# Patient Record
Sex: FEMALE | Race: WHITE | NOT HISPANIC OR LATINO | Employment: STUDENT | URBAN - METROPOLITAN AREA
[De-identification: names, ages, dates, MRNs, and addresses within clinical notes are randomized per-mention and may not be internally consistent; named-entity substitution may affect disease eponyms.]

---

## 2017-10-09 ENCOUNTER — CLINICAL SUPPORT (OUTPATIENT)
Dept: INTERNAL MEDICINE | Facility: CLINIC | Age: 22
End: 2017-10-09
Payer: COMMERCIAL

## 2017-10-09 ENCOUNTER — OFFICE VISIT (OUTPATIENT)
Dept: PULMONOLOGY | Facility: CLINIC | Age: 22
End: 2017-10-09
Payer: COMMERCIAL

## 2017-10-09 ENCOUNTER — OFFICE VISIT (OUTPATIENT)
Dept: OBSTETRICS AND GYNECOLOGY | Facility: CLINIC | Age: 22
End: 2017-10-09
Payer: COMMERCIAL

## 2017-10-09 VITALS
DIASTOLIC BLOOD PRESSURE: 64 MMHG | HEIGHT: 64 IN | WEIGHT: 110 LBS | HEART RATE: 56 BPM | BODY MASS INDEX: 18.78 KG/M2 | SYSTOLIC BLOOD PRESSURE: 101 MMHG

## 2017-10-09 VITALS
WEIGHT: 115.19 LBS | BODY MASS INDEX: 19.67 KG/M2 | DIASTOLIC BLOOD PRESSURE: 60 MMHG | SYSTOLIC BLOOD PRESSURE: 98 MMHG | HEIGHT: 64 IN

## 2017-10-09 DIAGNOSIS — Z00.00 ANNUAL PHYSICAL EXAM: Primary | ICD-10-CM

## 2017-10-09 DIAGNOSIS — Z00.00 ROUTINE GENERAL MEDICAL EXAMINATION AT A HEALTH CARE FACILITY: Primary | ICD-10-CM

## 2017-10-09 DIAGNOSIS — Z30.46 NEXPLANON REMOVAL: Primary | ICD-10-CM

## 2017-10-09 LAB
ALBUMIN SERPL BCP-MCNC: 3.7 G/DL
ALP SERPL-CCNC: 52 U/L
ALT SERPL W/O P-5'-P-CCNC: 33 U/L
ANION GAP SERPL CALC-SCNC: 5 MMOL/L
AST SERPL-CCNC: 23 U/L
BILIRUB SERPL-MCNC: 1.4 MG/DL
BUN SERPL-MCNC: 8 MG/DL
CALCIUM SERPL-MCNC: 9 MG/DL
CHLORIDE SERPL-SCNC: 107 MMOL/L
CHOLEST SERPL-MCNC: 109 MG/DL
CHOLEST/HDLC SERPL: 2.6 {RATIO}
CO2 SERPL-SCNC: 27 MMOL/L
CREAT SERPL-MCNC: 0.8 MG/DL
ERYTHROCYTE [DISTWIDTH] IN BLOOD BY AUTOMATED COUNT: 13.1 %
EST. GFR  (AFRICAN AMERICAN): >60 ML/MIN/1.73 M^2
EST. GFR  (NON AFRICAN AMERICAN): >60 ML/MIN/1.73 M^2
ESTIMATED AVG GLUCOSE: 85 MG/DL
GLUCOSE SERPL-MCNC: 82 MG/DL
HBA1C MFR BLD HPLC: 4.6 %
HCT VFR BLD AUTO: 41.3 %
HDLC SERPL-MCNC: 42 MG/DL
HDLC SERPL: 38.5 %
HGB BLD-MCNC: 14.7 G/DL
LDLC SERPL CALC-MCNC: 56.8 MG/DL
MCH RBC QN AUTO: 30.5 PG
MCHC RBC AUTO-ENTMCNC: 35.6 G/DL
MCV RBC AUTO: 86 FL
NONHDLC SERPL-MCNC: 67 MG/DL
PLATELET # BLD AUTO: 164 K/UL
PMV BLD AUTO: 12.1 FL
POTASSIUM SERPL-SCNC: 3.9 MMOL/L
PROT SERPL-MCNC: 6.8 G/DL
RBC # BLD AUTO: 4.82 M/UL
SODIUM SERPL-SCNC: 139 MMOL/L
TRIGL SERPL-MCNC: 51 MG/DL
WBC # BLD AUTO: 5.82 K/UL

## 2017-10-09 PROCEDURE — 99395 PREV VISIT EST AGE 18-39: CPT | Mod: S$GLB,,, | Performed by: INTERNAL MEDICINE

## 2017-10-09 PROCEDURE — 36415 COLL VENOUS BLD VENIPUNCTURE: CPT

## 2017-10-09 PROCEDURE — 99999 PR PBB SHADOW E&M-EST. PATIENT-LVL III: CPT | Mod: PBBFAC,,, | Performed by: INTERNAL MEDICINE

## 2017-10-09 PROCEDURE — 85027 COMPLETE CBC AUTOMATED: CPT

## 2017-10-09 PROCEDURE — 80061 LIPID PANEL: CPT

## 2017-10-09 PROCEDURE — 99499 UNLISTED E&M SERVICE: CPT | Mod: S$GLB,,, | Performed by: OBSTETRICS & GYNECOLOGY

## 2017-10-09 PROCEDURE — 11982 REMOVE DRUG IMPLANT DEVICE: CPT | Mod: S$GLB,,, | Performed by: OBSTETRICS & GYNECOLOGY

## 2017-10-09 PROCEDURE — 90688 IIV4 VACCINE SPLT 0.5 ML IM: CPT | Mod: S$GLB,,, | Performed by: INTERNAL MEDICINE

## 2017-10-09 PROCEDURE — 99999 PR PBB SHADOW E&M-EST. PATIENT-LVL III: CPT | Mod: PBBFAC,,, | Performed by: OBSTETRICS & GYNECOLOGY

## 2017-10-09 PROCEDURE — 83036 HEMOGLOBIN GLYCOSYLATED A1C: CPT

## 2017-10-09 PROCEDURE — 80053 COMPREHEN METABOLIC PANEL: CPT

## 2017-10-09 PROCEDURE — 90471 IMMUNIZATION ADMIN: CPT | Mod: S$GLB,,, | Performed by: INTERNAL MEDICINE

## 2017-10-09 RX ORDER — NORGESTIMATE AND ETHINYL ESTRADIOL 7DAYSX3 LO
1 KIT ORAL DAILY
Qty: 84 TABLET | Refills: 3 | Status: SHIPPED | OUTPATIENT
Start: 2017-10-09 | End: 2018-10-09

## 2017-10-09 NOTE — PROGRESS NOTES
Subjective:       Patient ID: Desi Hinton is a 22 y.o. female.    Chief Complaint: Annual Exam    HPI 21 yo female living in [place], [place] 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 She was seen recently in the local ER for pain and all studies were normal.She takes zantac and zololft and Ortho Tri Cyclen  Review of Systems   Constitutional: Negative.    HENT: Negative.    Eyes: Negative.    Respiratory: Negative.    Cardiovascular: Negative.    Gastrointestinal: Negative.    Genitourinary: Negative.         Hx of urological surgery at age 17. For UJP   Musculoskeletal: Negative.    Skin: Negative.    Neurological: Negative.    Psychiatric/Behavioral: Negative.    All other systems reviewed and are negative.      Objective:      Physical Exam   Constitutional: She is oriented to person, place, and time. She appears well-developed and well-nourished. No distress.   HENT:   Head: Normocephalic and atraumatic.   Right Ear: External ear normal.   Left Ear: External ear normal.   Nose: Nose normal.   Mouth/Throat: Oropharynx is clear and moist.   Eyes: Conjunctivae and EOM are normal. Pupils are equal, round, and reactive to light.   Neck: Normal range of motion. Neck supple. No JVD present. No thyromegaly present.   Cardiovascular: Normal rate, regular rhythm, normal heart sounds and intact distal pulses.  Exam reveals no gallop.    No murmur heard.  Pulmonary/Chest: Breath sounds normal. No stridor. No respiratory distress. She has no wheezes. She has no rales. She exhibits no tenderness.   Peak flow 400 l/min   Abdominal: Soft. Bowel sounds are normal. She exhibits no distension and no mass. There is no tenderness. There is no rebound and no guarding.   Musculoskeletal: Normal range of motion. She exhibits no edema.   Lymphadenopathy:     She has no cervical adenopathy.   Neurological:  She is alert and oriented to person, place, and time. She has normal reflexes. She displays normal reflexes. No cranial nerve deficit.   Skin: Skin is warm and dry. No rash noted.   Psychiatric: She has a normal mood and affect. Her behavior is normal. Judgment and thought content normal.   Nursing note and vitals reviewed.      Assessment:       No diagnosis found.    Plan:             Labs; All values are normal and unchanged from 2016.

## 2017-10-09 NOTE — PROGRESS NOTES
Nexplanon Removal:    Patient has Nexplanon which was placed 3 years ago. Ready to have it removed as it is reaching the end of efficacy.    Patient was counseled on risks, benefits and alternatives to Nexplanon removal and after all of her questions were answered she agreed to proceed.     Time out performed.    Procedure in detail:   Implant palpated in the medial area left upper arm cleaned with alcohol. Area injected with 1% lidocaine. Area then cleaned with betadine. Distal end of Nexplanon pushed toward the skin surface to tent up the tissue. 2 mm stab incision made and capsule grasped with hemastat. Capsule then incised. Second hemostat used to extract implant. Nexplanon was noted to be intact. Betadine cleaned and band-aid placed. Pressure dressing placed and postop care discussed.     Patient tolerated the procedure well.     Plan for Contraception: oral contraceptives (estrogen/progesterone)

## 2017-10-09 NOTE — LETTER
October 9, 2017    Desi Hinton  [address]  [place] [address]             [facility]  [address]  [place] [address]  Phone: [phone] Dear Ms. Hinton:    Thank you for allowing me to serve you and perform your Executive Health exam on 10/9/2017. This letter will serve as a brief summary of the physical findings and laboratory/studies performed and recommendations at this time. Today's exam is normal in all respects. Good luck with your new business.         If you have any questions or concerns, please don't hesitate to call.    Sincerely,        Malik Lang MD

## 2018-05-21 ENCOUNTER — OFFICE VISIT (OUTPATIENT)
Dept: FAMILY MEDICINE CLINIC | Age: 23
End: 2018-05-21

## 2018-05-21 VITALS
BODY MASS INDEX: 19.83 KG/M2 | DIASTOLIC BLOOD PRESSURE: 63 MMHG | RESPIRATION RATE: 16 BRPM | WEIGHT: 119 LBS | TEMPERATURE: 98.8 F | OXYGEN SATURATION: 98 % | HEIGHT: 65 IN | HEART RATE: 70 BPM | SYSTOLIC BLOOD PRESSURE: 98 MMHG

## 2018-05-21 DIAGNOSIS — Z76.89 ESTABLISHING CARE WITH NEW DOCTOR, ENCOUNTER FOR: ICD-10-CM

## 2018-05-21 DIAGNOSIS — F41.9 ANXIETY AND DEPRESSION: Primary | ICD-10-CM

## 2018-05-21 DIAGNOSIS — R53.82 CHRONIC FATIGUE: ICD-10-CM

## 2018-05-21 DIAGNOSIS — K21.9 GASTROESOPHAGEAL REFLUX DISEASE, ESOPHAGITIS PRESENCE NOT SPECIFIED: ICD-10-CM

## 2018-05-21 DIAGNOSIS — F32.A ANXIETY AND DEPRESSION: Primary | ICD-10-CM

## 2018-05-21 RX ORDER — AMOXICILLIN 875 MG/1
875 TABLET, FILM COATED ORAL 2 TIMES DAILY
COMMUNITY
End: 2018-06-11 | Stop reason: ALTCHOICE

## 2018-05-21 RX ORDER — SERTRALINE HYDROCHLORIDE 25 MG/1
25 TABLET, FILM COATED ORAL DAILY
Qty: 30 TAB | Refills: 1 | Status: SHIPPED | OUTPATIENT
Start: 2018-05-21

## 2018-05-21 NOTE — MR AVS SNAPSHOT
[address] 
[phone] Patient: Morenita Griffith 
MRN: [id number] BWK:[birth year] Visit Information Date & Time Provider Department Dept. Phone Encounter #  
 5/21/2018  8:35 AM Edyta Carrillo, [facility] [facility] [phone] [id number] Follow-up Instructions Return in about 2 weeks (around 6/4/2018) for Follow up. Upcoming Health Maintenance Date Due  
 HPV Age 9Y-34Y (1 of 1 - Female 3 Dose Series) 6/17/2006 DTaP/Tdap/Td series (1 - Tdap) 6/17/2016 PAP AKA CERVICAL CYTOLOGY 6/17/2016 Influenza Age 5 to Adult 8/1/2018 Allergies as of 5/21/2018  Review Complete On: 5/21/2018 By: Paulina Raygoza LPN Severity Noted Reaction Type Reactions Diflucan [Fluconazole]  05/21/2018    Swelling Throat Monistat 1 (Tioconazole) [Tioconazole]  05/21/2018    Other (comments) Vaginal burning Current Immunizations  Never Reviewed No immunizations on file. Not reviewed this visit You Were Diagnosed With   
  
 Codes Comments Anxiety and depression    -  Primary ICD-10-CM: F41.9, F32.9 ICD-9-CM: 300.00, 311 Gastroesophageal reflux disease, esophagitis presence not specified     ICD-10-CM: K21.9 ICD-9-CM: 530.81 Chronic fatigue     ICD-10-CM: R53.82 
ICD-9-CM: 780.79 Establishing care with new doctor, encounter for     ICD-10-CM: Z76.89 
ICD-9-CM: V65.8 Vitals BP Pulse Temp Resp Height(growth percentile) Weight(growth percentile) 98/63 70 98.8 °F (37.1 °C) (Oral) 16 5' 5\" (1.651 m) 119 lb (54 kg) SpO2 BMI Smoking Status 98% 19.8 kg/m2 Never Smoker Vitals History BMI and BSA Data Body Mass Index Body Surface Area  
 19.8 kg/m 2 1.57 m 2 Preferred Pharmacy Pharmacy Name Phone [place] [address] [address] [phone] Your Updated Medication List  
  
 This list is accurate as of 5/21/18  9:23 AM.  Always use your most recent med list.  
  
  
  
  
 amoxicillin 875 mg tablet Commonly known as:  AMOXIL Take 875 mg by mouth two (2) times a day. LILETTA IU  
by IntraUTERine route. M-VIT PO Take  by mouth. sertraline 25 mg tablet Commonly known as:  ZOLOFT Take 1 Tab by mouth daily. Prescriptions Sent to Pharmacy Refills  
 sertraline (ZOLOFT) 25 mg tablet 1 Sig: Take 1 Tab by mouth daily. Class: Normal  
 [facility] [address] [address] [facility]. Ph #: [phone] Route: Oral  
  
We Performed the Following BEHAV ASSMT W/SCORE & DOCD/STAND INSTRUMENT [id number] CPT(R)] CBC WITH AUTOMATED DIFF [09118 CPT(R)] METABOLIC PANEL, COMPREHENSIVE [83691 CPT(R)] REFERRAL TO PSYCHOLOGY [EXJ41 Custom] TSH RFX ON ABNORMAL TO FREE T4 [[id number] Custom] Follow-up Instructions Return in about 2 weeks (around 6/4/2018) for Follow up. Referral Information Referral ID Referred By Referred To  
  
 [id number] Bk DAVIDSON Not Available Visits Status Start Date End Date 1 New Request 5/21/18 5/21/19 If your referral has a status of pending review or denied, additional information will be sent to support the outcome of this decision. Patient Instructions   
-You can schedule an appointment with the [address] [place] [address] (Tuesday mornings) at the . 
 
-Please follow up in 2-4 weeks after starting zoloft 25 mg once daily. Recovering From Depression: Care Instructions Your Care Instructions Taking good care of yourself is important as you recover from depression. In time, your symptoms will fade as your treatment takes hold. Do not give up. Instead, focus your energy on getting better. Your mood will improve. It just takes some time.  Focus on things that can help you feel better, such as being with friends and family, eating well, and getting enough rest. But take things slowly. Do not do too much too soon. You will begin to feel better gradually. Follow-up care is a key part of your treatment and safety. Be sure to make and go to all appointments, and call your doctor if you are having problems. It's also a good idea to know your test results and keep a list of the medicines you take. How can you care for yourself at home? Be realistic · If you have a large task to do, break it up into smaller steps you can handle, and just do what you can. · You may want to put off important decisions until your depression has lifted. If you have plans that will have a major impact on your life, such as marriage, divorce, or a job change, try to wait a bit. Talk it over with friends and loved ones who can help you look at the overall picture first. 
· Reaching out to people for help is important. Do not isolate yourself. Let your family and friends help you. Find someone you can trust and confide in, and talk to that person. · Be patient, and be kind to yourself. Remember that depression is not your fault and is not something you can overcome with willpower alone. Treatment is necessary for depression, just like for any other illness. Feeling better takes time, and your mood will improve little by little. Stay active · Stay busy and get outside. Take a walk, or try some other light exercise. · Talk with your doctor about an exercise program. Exercise can help with mild depression. · Go to a movie or concert. Take part in a Taoist activity or other social gathering. Go to a ball game. · Ask a friend to have dinner with you. Take care of yourself · Eat a balanced diet with plenty of fresh fruits and vegetables, whole grains, and lean protein. If you have lost your appetite, eat small snacks rather than large meals. · Avoid drinking alcohol or using illegal drugs. Do not take medicines that have not been prescribed for you. They may interfere with medicines you may be taking for depression, or they may make your depression worse. · Take your medicines exactly as they are prescribed. You may start to feel better within 1 to 3 weeks of taking antidepressant medicine. But it can take as many as 6 to 8 weeks to see more improvement. If you have questions or concerns about your medicines, or if you do not notice any improvement by 3 weeks, talk to your doctor. · If you have any side effects from your medicine, tell your doctor. Antidepressants can make you feel tired, dizzy, or nervous. Some people have dry mouth, constipation, headaches, sexual problems, or diarrhea. Many of these side effects are mild and will go away on their own after you have been taking the medicine for a few weeks. Some may last longer. Talk to your doctor if side effects are bothering you too much. You might be able to try a different medicine. · Get enough sleep. If you have problems sleeping: ¨ Go to bed at the same time every night, and get up at the same time every morning. ¨ Keep your bedroom dark and quiet. ¨ Do not exercise after 5:00 p.m. ¨ Avoid drinks with caffeine after 5:00 p.m. · Avoid sleeping pills unless they are prescribed by the doctor treating your depression. Sleeping pills may make you groggy during the day, and they may interact with other medicine you are taking. · If you have any other illnesses, such as diabetes, heart disease, or high blood pressure, make sure to continue with your treatment. Tell your doctor about all of the medicines you take, including those with or without a prescription. · Keep the numbers for these national suicide hotlines: 0-604-781-TALK (2-946.220.7326) and 3-737-OUHKYXU (1-812.966.4262). If you or someone you know talks about suicide or feeling hopeless, get help right away. When should you call for help? Call 911 anytime you think you may need emergency care. For example, call if: 
? · You feel like hurting yourself or someone else. ? · Someone you know has depression and is about to attempt or is attempting suicide. ?Call your doctor now or seek immediate medical care if: 
? · You hear voices. ? · Someone you know has depression and: 
¨ Starts to give away his or her possessions. ¨ Uses illegal drugs or drinks alcohol heavily. ¨ Talks or writes about death, including writing suicide notes or talking about guns, knives, or pills. ¨ Starts to spend a lot of time alone. ¨ Acts very aggressively or suddenly appears calm. ? Watch closely for changes in your health, and be sure to contact your doctor if: 
? · You do not get better as expected. Where can you learn more? Go to http://lashonEdgewater Networksdesirae.info/. Enter Q114 in the search box to learn more about \"Recovering From Depression: Care Instructions. \" Current as of: May 12, 2017 Content Version: 11.4 © 3513-1358 [employer]. Care instructions adapted under license by [employer] (which disclaims liability or warranty for this information). If you have questions about a medical condition or this instruction, always ask your healthcare professional. [address] any warranty or liability for your use of this information. Sertraline (By mouth) Sertraline (SER-tra-xavi) Treats depression, obsessive-compulsive disorder (OCD), posttraumatic stress disorder (PTSD), premenstrual dysphoric disorder (PMDD), social anxiety disorder, and panic disorder. This medicine is an SSRI. Brand Name(s): Zoloft There may be other brand names for this medicine. When This Medicine Should Not Be Used: This medicine is not right for everyone. Do not use it if you had an allergic reaction to sertraline. How to Use This Medicine:  
Liquid, Tablet · Take your medicine as directed. Your dose may need to be changed several times to find what works best for you. You may need to take it for a few weeks or months before you feel better. · Oral liquid: Use the dropper provided to remove the medicine and mix it with 1/2 cup (4 ounces) of water, ginger ale, lemon-lime soda, lemonade, or orange juice. Drink the mixture right away. It is normal for it to look a bit hazy. · This medicine should come with a Medication Guide. Ask your pharmacist for a copy if you do not have one. · Missed dose: Take a dose as soon as you remember. If it is almost time for your next dose, wait until then and take a regular dose. Do not take extra medicine to make up for a missed dose. · Store the medicine in a closed container at room temperature, away from heat, moisture, and direct light. Drugs and Foods to Avoid: Ask your doctor or pharmacist before using any other medicine, including over-the-counter medicines, vitamins, and herbal products. · Do not use this medicine together with pimozide. Do not use this medicine and an MAO inhibitor (MAOI) within 14 days of each other. Do not use the oral liquid form of sertraline if you are also using disulfiram. 
· Some medicines can affect how sertraline works. Tell your doctor if you are using the following:  
¨ Buspirone, cimetidine, cisapride, diazepam, digitoxin, fentanyl, flecainide, lithium, phenytoin, propafenone, Gigi's wort, tramadol, tryptophan supplements, or valproate ¨ A blood thinner (such as warfarin), a diuretic (water pill), an NSAID pain or arthritis medicine (such as aspirin, diclofenac, ibuprofen), a tricyclic antidepressant, a triptan medicine for migraine headaches · Do not drink alcohol while you are using this medicine. Warnings While Using This Medicine: · Tell your doctor if you are pregnant or breastfeeding, or if you have liver disease, bleeding problems, glaucoma, heart disease, or a seizure disorder. · For some children, teenagers, and young adults, this medicine may increase mental or emotional problems. This may lead to thoughts of suicide and violence. Talk with your doctor right away if you have any thoughts or behavior changes that concern you. Tell your doctor if you or anyone in your family has a history of bipolar disorder or suicide attempts. · This medicine may cause the following problems:  
¨ Serotonin syndrome (when taken with certain medicines) ¨ Low sodium levels (more common in elderly patients and those who take diuretics or become dehydrated) · Tell your doctor if you are sensitive to latex, because the oral liquid comes with a latex rubber dropper. · This medicine may make you dizzy or drowsy. Do not drive or do anything that could be dangerous until you know how this medicine affects you. · Do not stop using this medicine suddenly. Your doctor will need to slowly decrease your dose before you stop it completely. · Your doctor will check your progress and the effects of this medicine at regular visits. Keep all appointments. · Keep all medicine out of the reach of children. Never share your medicine with anyone. Possible Side Effects While Using This Medicine:  
Call your doctor right away if you notice any of these side effects: · Allergic reaction: Itching or hives, swelling in your face or hands, swelling or tingling in your mouth or throat, chest tightness, trouble breathing · Anxiety, restlessness, fast heartbeat, fever, sweating, muscle spasms, twitching, nausea, vomiting, diarrhea, seeing or hearing things that are not there · Blistering, peeling, or red skin rash · Confusion, weakness, and muscle twitching · Eye pain, vision changes, seeing halos around lights · Feeling more excited or energetic than usual 
· Thoughts of hurting yourself or others, unusual behavior · Unusual bleeding or bruising If you notice these less serious side effects, talk with your doctor: · Dry mouth · Loss of appetite, weight loss · Mild diarrhea, constipation, nausea, vomiting · Sexual problems · Sleepiness, or trouble sleeping If you notice other side effects that you think are caused by this medicine, tell your doctor. Call your doctor for medical advice about side effects. You may report side effects to FDA at 7-165-JKL-7304 © 2017 [address]  Information is for End User's use only and may not be sold, redistributed or otherwise used for commercial purposes. The above information is an  only. It is not intended as medical advice for individual conditions or treatments. Talk to your doctor, nurse or pharmacist before following any medical regimen to see if it is safe and effective for you. Annika Glover, [address] [facility] [address] [facility] 33 
[phone] [address] [place] [address] Phone: [phone] FAX: [phone] 
[address] [facility] 23 [facility] Phone: [phone] FAX: [phone] 
201 [facility], [address] [facility] 7 [address] [facility] [phone] FAX: [phone] [place] [address] 33 [facility] [phone] FAX: [phone] [facility], [address] [facility], [facility] [address] [facility] [address] [facility] [address] [facility], 103 [facility].  
[phone] [facility] [address] 1099 [facility], [address] 
[phone] [facility] Office [address] [place] [address], 15 [facility] 
[phone] [facility] ([place] near [facility]) [phone] [address] [place] [address] Location [address] [facility] [address] [place] [address] [address], [address] 
[phone] [employer] [facility] (for residents of [place]) Street Address [address] Jose Enrique Gramajo Phone Numbers 
[phone] TDD [phone] FAX [phone] Crisis Intervention 24 Hours/Day  
[phone] [facility] (for residents of [facility],  [place], Donna Meigs, [place], [place], [place], and [address] [place] [address]) Referral/Intake Services [phone] Emergency Services (24 hrs 365 days) [phone]  
(all clinics during business hours & [place] location for after hours) Introducing hospitals & HEALTH SERVICES! Catia Harman introduces [employer] patient portal. Now you can access parts of your medical record, email your doctor's office, and request medication refills online. 1. In your internet browser, go to [email or web address] 2. Click on the First Time User? Click Here link in the Sign In box. You will see the New Member Sign Up page. 3. Enter your [employer] Access Code exactly as it appears below. You will not need to use this code after youve completed the sign-up process. If you do not sign up before the expiration date, you must request a new code. · [employer] Access Code: [email or web address] Expires: 8/19/2018  8:40 AM 
 
4. Enter the last four digits of your Social Security Number (xxxx) and Date of Birth (mm/dd/yyyy) as indicated and click Submit. You will be taken to the next sign-up page. 5. Create a Nano ePrintt ID. This will be your [employer] login ID and cannot be changed, so think of one that is secure and easy to remember. 6. Create a [employer] password. You can change your password at any time. 7. Enter your Password Reset Question and Answer. This can be used at a later time if you forget your password. 8. Enter your e-mail address. You will receive e-mail notification when new information is available in [address] 9. Click Sign Up. You can now view and download portions of your medical record. 10. Click the Download Summary menu link to download a portable copy of your medical information. If you have questions, please visit the Frequently Asked Questions section of the [employer] website. Remember, [employer] is NOT to be used for urgent needs. For medical emergencies, dial 911. Now available from your iPhone and Android! Please provide this summary of care documentation to your next provider. If you have any questions after today's visit, please call [phone].

## 2018-05-21 NOTE — PROGRESS NOTES
Chief Complaint:  Chief Complaint   Patient presents with    [facility]    GERD     HPI  Michelle Lisa is a 25 y.o. female who presents for establishment [facility] [place]. Graduated from [employer] last year and hasn't gotten PCP yet. From [place]. Reflux:  Diagnosed in college. Sxs are intermittent depending on the week. Last month would wake up at 4 am with sensation of stomach acid. Now not having sxs. Thinks celery makes it worse. She drinks 1 cup of coffee or tea per day. Eats a lot of spicy food. Not a lot of fresh fruit. She uses ranitidine or tums as needed with good relief. No family history of esophogeal, stomach, or colon cancers. Chronic fatigue:  Not sure if related to anxiety/depression or chronic pain s/p kidney surgery. Throughout college but maybe before that. Feels the need to sit after small tasks. Can't stand too long or take big walks. Feels sleepy and doesn't want to get up in the morning. Bedtime around 11pm but doesn't get to sleep until 2pm.  Sometimes uses melatonin. Wakes at 8 am.  Does yoga. Anxiety and depression:  Feels like symptoms wax and wane and has been better since graduation. Diagnosed in ~5012-6168. Was taking zoloft for about 3.5 years with some improvement. Had some chest pains in this period with normal EKGs. Was told she was likely having panic attacks. She decided to stop the zoloft and hasn't taken medication since. Feels anxious when learning new materials. Has performance anxiety at job. No sxs of zohreh.   PHQ over the last two weeks 5/21/2018   Little interest or pleasure in doing things More than half the days   Feeling down, depressed or hopeless More than half the days   Total Score PHQ 2 4   Trouble falling or staying asleep, or sleeping too much Nearly every day   Feeling tired or having little energy Nearly every day   Poor appetite or overeating Nearly every day   Feeling bad about yourself - or that you are a failure or have let yourself or your family down Nearly every day   Trouble concentrating on things such as school, work, reading or watching TV Several days   Moving or speaking so slowly that other people could have noticed; or the opposite being so fidgety that others notice Nearly every day   Thoughts of being better off dead, or hurting yourself in some way Not at all   PHQ 9 Score 20   How difficult have these problems made it for you to do your work, take care of your home and get along with others Very difficult     Had left [facility] obstruction in 2012. Has been cleared from Urology but notes still has some discomfort. Planning to see Urology here. Taking augmentin since 8/15 for ear infection and sinus infection diagnosed at urgent care. LMP November 2017. Had IUD placed in October 2017. SH:  Lives with boyfriend and another roommate. Works full time as a seamstress. No tobacco or drug use. Occasional alcohol use. ROS:   No fevers or unexpected weight loss. Does get sinus HAs. Does have fatigue. No vision changes. No SOB or cough. No chest pain. Sometimes constipation or diarrhea seems to be related to foods. No dysuria. No vaginal bleeding or discharge. No dizziness. No excess thirst, hunger, urination  No substance abuse  Denies SI    Allergies: Allergies   Allergen Reactions    Diflucan [Fluconazole] Swelling     Throat      Monistat 1 (Tioconazole) [Tioconazole] Other (comments)     Vaginal burning       Meds:     Current Outpatient Prescriptions:     prenatal vits/iron fum/folic (M-VIT PO), Take  by mouth., Disp: , Rfl:     amoxicillin (AMOXIL) 875 mg tablet, Take 875 mg by mouth two (2) times a day., Disp: , Rfl:     levonorgestrel (LILETTA IU), by IntraUTERine route., Disp: , Rfl:     sertraline (ZOLOFT) 25 mg tablet, Take 1 Tab by mouth daily. , Disp: 30 Tab, Rfl: 1    PMH:  Past Medical History:   Diagnosis Date    Anxiety associated with depression     GERD (gastroesophageal reflux disease)     Kidney pain      FH:   Family History   Problem Relation Age of Onset    Downs Syndrome Sister     Diabetes Maternal Grandfather      Physical Exam:  Visit Vitals    BP 98/63    Pulse 70    Temp 98.8 °F (37.1 °C) (Oral)    Resp 16    Ht 5' 5\" (1.651 m)    Wt 119 lb (54 kg)    SpO2 98%    BMI 19.8 kg/m2     Gen: No apparent distress. Pleasant and conversational.  HEENT: Normocephalic, pupils equally round and reactive, extraocular eye movements intact, hearing grossly normal bilaterally, nose normal and patent with no erythema, mucous membranes moist, pharynx normal without lesions  Neck: Supple, normal ROM  Lungs: Respirations unlabored, clear to auscultation bilaterally  Cardio: Regular rate and rhythm without murmurs, rubs, or gallops   Abdomen: Normoactive bowel sounds, soft, nontender, nondistended  Ext: No peripheral edema, erythema, or tenderness  Skin: Warm and dry  Neuro: Alert and responds to all questions appropriately. CN 2-12, sensation, strength, gait grossly intact. Psych: Makes good eye contact. Appearance, behavior, and conversation appropriate with normal speech rate, fluency, content. Good judgment and insight. Appears future/goal oriented. Denies SI/HI. Assessment and Plan:     Encounter Diagnoses:    ICD-10-CM ICD-9-CM    1. Anxiety and depression F41.9 300.00 REFERRAL TO PSYCHOLOGY    F32.9 311 BEHAV ASSMT W/SCORE & DOCD/STAND INSTRUMENT   2. Gastroesophageal reflux disease, esophagitis presence not specified K21.9 530.81    3. Chronic fatigue R53.82 780.79 CBC WITH AUTOMATED DIFF      METABOLIC PANEL, COMPREHENSIVE      TSH RFX ON ABNORMAL TO FREE T4   4. Establishing care with new doctor, encounter for Z76.89 V65.8      -PHQ-9 score of 20. CALIVN score of 20. Patient interested in medication and counseling.  -Resume zoloft at 25 mg/d. Potential SEs discussed. -Referral for psychology.  Local resources provided.  -Patient will schedule family stress center visit.  -Self care measures for anxiety/depression reviewed. -GERD diet discussed. Avoid reclining for 2 hours after eating. Can continue prn tums or zantac use.   -Checking labs today as above to evaluate fatigue.  -Rtc 2-4 weeks for re-eval.    Josh Joel MD  Family Medicine Resident, PGY-3

## 2018-05-21 NOTE — PATIENT INSTRUCTIONS
-You can schedule an appointment with the [address] [place] [address] (Tuesday mornings) at the .    -Please follow up in 2-4 weeks after starting zoloft 25 mg once daily. Recovering From Depression: Care Instructions  Your Care Instructions    Taking good care of yourself is important as you recover from depression. In time, your symptoms will fade as your treatment takes hold. Do not give up. Instead, focus your energy on getting better. Your mood will improve. It just takes some time. Focus on things that can help you feel better, such as being with friends and family, eating well, and getting enough rest. But take things slowly. Do not do too much too soon. You will begin to feel better gradually. Follow-up care is a key part of your treatment and safety. Be sure to make and go to all appointments, and call your doctor if you are having problems. It's also a good idea to know your test results and keep a list of the medicines you take. How can you care for yourself at home? Be realistic  · If you have a large task to do, break it up into smaller steps you can handle, and just do what you can. · You may want to put off important decisions until your depression has lifted. If you have plans that will have a major impact on your life, such as marriage, divorce, or a job change, try to wait a bit. Talk it over with friends and loved ones who can help you look at the overall picture first.  · Reaching out to people for help is important. Do not isolate yourself. Let your family and friends help you. Find someone you can trust and confide in, and talk to that person. · Be patient, and be kind to yourself. Remember that depression is not your fault and is not something you can overcome with willpower alone. Treatment is necessary for depression, just like for any other illness. Feeling better takes time, and your mood will improve little by little. Stay active  · Stay busy and get outside.  Take a walk, or try some other light exercise. · Talk with your doctor about an exercise program. Exercise can help with mild depression. · Go to a movie or concert. Take part in a Gnosticist activity or other social gathering. Go to a ball game. · Ask a friend to have dinner with you. Take care of yourself  · Eat a balanced diet with plenty of fresh fruits and vegetables, whole grains, and lean protein. If you have lost your appetite, eat small snacks rather than large meals. · Avoid drinking alcohol or using illegal drugs. Do not take medicines that have not been prescribed for you. They may interfere with medicines you may be taking for depression, or they may make your depression worse. · Take your medicines exactly as they are prescribed. You may start to feel better within 1 to 3 weeks of taking antidepressant medicine. But it can take as many as 6 to 8 weeks to see more improvement. If you have questions or concerns about your medicines, or if you do not notice any improvement by 3 weeks, talk to your doctor. · If you have any side effects from your medicine, tell your doctor. Antidepressants can make you feel tired, dizzy, or nervous. Some people have dry mouth, constipation, headaches, sexual problems, or diarrhea. Many of these side effects are mild and will go away on their own after you have been taking the medicine for a few weeks. Some may last longer. Talk to your doctor if side effects are bothering you too much. You might be able to try a different medicine. · Get enough sleep. If you have problems sleeping:  ¨ Go to bed at the same time every night, and get up at the same time every morning. ¨ Keep your bedroom dark and quiet. ¨ Do not exercise after 5:00 p.m. ¨ Avoid drinks with caffeine after 5:00 p.m. · Avoid sleeping pills unless they are prescribed by the doctor treating your depression.  Sleeping pills may make you groggy during the day, and they may interact with other medicine you are taking. · If you have any other illnesses, such as diabetes, heart disease, or high blood pressure, make sure to continue with your treatment. Tell your doctor about all of the medicines you take, including those with or without a prescription. · Keep the numbers for these national suicide hotlines: 4-421-393-TALK (4-570.748.9587) and 4-506-GQSNFLC (2-829.189.4479). If you or someone you know talks about suicide or feeling hopeless, get help right away. When should you call for help? Call 911 anytime you think you may need emergency care. For example, call if:  ? · You feel like hurting yourself or someone else. ? · Someone you know has depression and is about to attempt or is attempting suicide. ?Call your doctor now or seek immediate medical care if:  ? · You hear voices. ? · Someone you know has depression and:  ¨ Starts to give away his or her possessions. ¨ Uses illegal drugs or drinks alcohol heavily. ¨ Talks or writes about death, including writing suicide notes or talking about guns, knives, or pills. ¨ Starts to spend a lot of time alone. ¨ Acts very aggressively or suddenly appears calm. ? Watch closely for changes in your health, and be sure to contact your doctor if:  ? · You do not get better as expected. Where can you learn more? Go to http://lsahon-desirae.info/. Enter M557 in the search box to learn more about \"Recovering From Depression: Care Instructions. \"  Current as of: May 12, 2017  Content Version: 11.4  © 0304-1701 Healthwise, Incorporated. Care instructions adapted under license by [employer] (which disclaims liability or warranty for this information). If you have questions about a medical condition or this instruction, always ask your healthcare professional. [address] any warranty or liability for your use of this information.       Sertraline (By mouth)   Sertraline (SER-tra-xavi)  Treats depression, obsessive-compulsive disorder (OCD), posttraumatic stress disorder (PTSD), premenstrual dysphoric disorder (PMDD), social anxiety disorder, and panic disorder. This medicine is an SSRI. Brand Name(s): Zoloft   There may be other brand names for this medicine. When This Medicine Should Not Be Used: This medicine is not right for everyone. Do not use it if you had an allergic reaction to sertraline. How to Use This Medicine:   Liquid, Tablet  · Take your medicine as directed. Your dose may need to be changed several times to find what works best for you. You may need to take it for a few weeks or months before you feel better. · Oral liquid: Use the dropper provided to remove the medicine and mix it with 1/2 cup (4 ounces) of water, ginger ale, lemon-lime soda, lemonade, or orange juice. Drink the mixture right away. It is normal for it to look a bit hazy. · This medicine should come with a Medication Guide. Ask your pharmacist for a copy if you do not have one. · Missed dose: Take a dose as soon as you remember. If it is almost time for your next dose, wait until then and take a regular dose. Do not take extra medicine to make up for a missed dose. · Store the medicine in a closed container at room temperature, away from heat, moisture, and direct light. Drugs and Foods to Avoid:   Ask your doctor or pharmacist before using any other medicine, including over-the-counter medicines, vitamins, and herbal products. · Do not use this medicine together with pimozide. Do not use this medicine and an MAO inhibitor (MAOI) within 14 days of each other. Do not use the oral liquid form of sertraline if you are also using disulfiram.  · Some medicines can affect how sertraline works.  Tell your doctor if you are using the following:   ¨ Buspirone, cimetidine, cisapride, diazepam, digitoxin, fentanyl, flecainide, lithium, phenytoin, propafenone, Gigi's wort, tramadol, tryptophan supplements, or valproate  ¨ A blood thinner (such as warfarin), a diuretic (water pill), an NSAID pain or arthritis medicine (such as aspirin, diclofenac, ibuprofen), a tricyclic antidepressant, a triptan medicine for migraine headaches  · Do not drink alcohol while you are using this medicine. Warnings While Using This Medicine:   · Tell your doctor if you are pregnant or breastfeeding, or if you have liver disease, bleeding problems, glaucoma, heart disease, or a seizure disorder. · For some children, teenagers, and young adults, this medicine may increase mental or emotional problems. This may lead to thoughts of suicide and violence. Talk with your doctor right away if you have any thoughts or behavior changes that concern you. Tell your doctor if you or anyone in your family has a history of bipolar disorder or suicide attempts. · This medicine may cause the following problems:   ¨ Serotonin syndrome (when taken with certain medicines)  ¨ Low sodium levels (more common in elderly patients and those who take diuretics or become dehydrated)  · Tell your doctor if you are sensitive to latex, because the oral liquid comes with a latex rubber dropper. · This medicine may make you dizzy or drowsy. Do not drive or do anything that could be dangerous until you know how this medicine affects you. · Do not stop using this medicine suddenly. Your doctor will need to slowly decrease your dose before you stop it completely. · Your doctor will check your progress and the effects of this medicine at regular visits. Keep all appointments. · Keep all medicine out of the reach of children. Never share your medicine with anyone.   Possible Side Effects While Using This Medicine:   Call your doctor right away if you notice any of these side effects:  · Allergic reaction: Itching or hives, swelling in your face or hands, swelling or tingling in your mouth or throat, chest tightness, trouble breathing  · Anxiety, restlessness, fast heartbeat, fever, sweating, muscle spasms, twitching, nausea, vomiting, diarrhea, seeing or hearing things that are not there  · Blistering, peeling, or red skin rash  · Confusion, weakness, and muscle twitching  · Eye pain, vision changes, seeing halos around lights  · Feeling more excited or energetic than usual  · Thoughts of hurting yourself or others, unusual behavior  · Unusual bleeding or bruising  If you notice these less serious side effects, talk with your doctor:   · Dry mouth  · Loss of appetite, weight loss  · Mild diarrhea, constipation, nausea, vomiting  · Sexual problems  · Sleepiness, or trouble sleeping  If you notice other side effects that you think are caused by this medicine, tell your doctor. Call your doctor for medical advice about side effects. You may report side effects to FDA at 8-408-REY-9741  ©  2600 Yuri  Information is for End User's use only and may not be sold, redistributed or otherwise used for commercial purposes. The above information is an  only. It is not intended as medical advice for individual conditions or treatments. Talk to your doctor, nurse or pharmacist before following any medical regimen to see if it is safe and effective for you. Katt Acevedo[address] [facility]  [phone]    [address] Office  Phone: [phone]  FAX: [phone]  [address]  Phone: [phone]  FAX: [phone]  [place] [address], [address]  [facility] 7 [address] [facility] Office  [phone]  FAX: [phone]  [address] [place] [address], [facility] 33    [address]  [phone]  FAX: 800 [facility], [address] [facility], [address] [place], [place] [address]way [address]    [facility] [place] [facility] of [address] [place] [address] Road  [facility] 1560  [facility] [address] [facility].   [phone]    The [address] [facility] Office  [place] [address] [address], [address]  [phone]    [facility] Office  [address], 15 [facility]  [phone]    [facility]  [facility] near [facility])  [phone]    [facility]  35 [facility], [address] and [place] [address], [address] [place] [address], [address]  [phone]    Rob Mon (for residents of [facility])  [address] Address  [facility] [address]  Phone Numbers  [phone]   TDD [phone]   FAX [phone]  Crisis Intervention  24 Hours/Day   [phone] (for residents of [facility], and [facility])  Referral/Intake Services [phone]   Emergency Services (24 hrs 365 days) [phone]   (all clinics during business hours & [facility] for after hours)

## 2018-05-21 NOTE — PROGRESS NOTES
Chief Complaint   Patient presents with    Establish Care    GERD     1. Have you been to the ER, urgent care clinic since your last visit? Hospitalized since your last visit? N/A    2. Have you seen or consulted any other health care providers outside of the [facility] since your last visit? Include any pap smears or colon screening.  N/A

## 2018-05-22 LAB
ALBUMIN SERPL-MCNC: 4.8 G/DL (ref 3.5–5.5)
ALBUMIN/GLOB SERPL: 1.8 {RATIO} (ref 1.2–2.2)
ALP SERPL-CCNC: 55 IU/L (ref 39–117)
ALT SERPL-CCNC: 15 IU/L (ref 0–32)
AST SERPL-CCNC: 17 IU/L (ref 0–40)
BASOPHILS # BLD AUTO: 0 X10E3/UL (ref 0–0.2)
BASOPHILS NFR BLD AUTO: 0 %
BILIRUB SERPL-MCNC: 1.3 MG/DL (ref 0–1.2)
BUN SERPL-MCNC: 9 MG/DL (ref 6–20)
BUN/CREAT SERPL: 11 (ref 9–23)
CALCIUM SERPL-MCNC: 9.8 MG/DL (ref 8.7–10.2)
CHLORIDE SERPL-SCNC: 102 MMOL/L (ref 96–106)
CO2 SERPL-SCNC: 20 MMOL/L (ref 18–29)
CREAT SERPL-MCNC: 0.81 MG/DL (ref 0.57–1)
EOSINOPHIL # BLD AUTO: 0.1 X10E3/UL (ref 0–0.4)
EOSINOPHIL NFR BLD AUTO: 1 %
ERYTHROCYTE [DISTWIDTH] IN BLOOD BY AUTOMATED COUNT: 13.3 % (ref 12.3–15.4)
GFR SERPLBLD CREATININE-BSD FMLA CKD-EPI: 103 ML/MIN/1.73
GFR SERPLBLD CREATININE-BSD FMLA CKD-EPI: 119 ML/MIN/1.73
GLOBULIN SER CALC-MCNC: 2.7 G/DL (ref 1.5–4.5)
GLUCOSE SERPL-MCNC: 91 MG/DL (ref 65–99)
HCT VFR BLD AUTO: 43.3 % (ref 34–46.6)
HGB BLD-MCNC: 15.1 G/DL (ref 11.1–15.9)
IMM GRANULOCYTES # BLD: 0 X10E3/UL (ref 0–0.1)
IMM GRANULOCYTES NFR BLD: 0 %
LYMPHOCYTES # BLD AUTO: 1.9 X10E3/UL (ref 0.7–3.1)
LYMPHOCYTES NFR BLD AUTO: 36 %
MCH RBC QN AUTO: 31.1 PG (ref 26.6–33)
MCHC RBC AUTO-ENTMCNC: 34.9 G/DL (ref 31.5–35.7)
MCV RBC AUTO: 89 FL (ref 79–97)
MONOCYTES # BLD AUTO: 0.4 X10E3/UL (ref 0.1–0.9)
MONOCYTES NFR BLD AUTO: 7 %
NEUTROPHILS # BLD AUTO: 2.8 X10E3/UL (ref 1.4–7)
NEUTROPHILS NFR BLD AUTO: 56 %
PLATELET # BLD AUTO: 198 X10E3/UL (ref 150–379)
POTASSIUM SERPL-SCNC: 4.2 MMOL/L (ref 3.5–5.2)
PROT SERPL-MCNC: 7.5 G/DL (ref 6–8.5)
RBC # BLD AUTO: 4.85 X10E6/UL (ref 3.77–5.28)
SODIUM SERPL-SCNC: 140 MMOL/L (ref 134–144)
T4 FREE SERPL-MCNC: 1.65 NG/DL (ref 0.82–1.77)
TSH SERPL DL<=0.005 MIU/L-ACNC: 6.27 UIU/ML (ref 0.45–4.5)
WBC # BLD AUTO: 5.2 X10E3/UL (ref 3.4–10.8)

## 2018-06-11 ENCOUNTER — OFFICE VISIT (OUTPATIENT)
Dept: FAMILY MEDICINE CLINIC | Age: 23
End: 2018-06-11

## 2018-06-11 VITALS
RESPIRATION RATE: 16 BRPM | DIASTOLIC BLOOD PRESSURE: 62 MMHG | TEMPERATURE: 98.2 F | SYSTOLIC BLOOD PRESSURE: 94 MMHG | OXYGEN SATURATION: 97 % | BODY MASS INDEX: 20.56 KG/M2 | WEIGHT: 123.4 LBS | HEART RATE: 80 BPM | HEIGHT: 65 IN

## 2018-06-11 DIAGNOSIS — F41.9 ANXIETY AND DEPRESSION: Primary | ICD-10-CM

## 2018-06-11 DIAGNOSIS — F32.A ANXIETY AND DEPRESSION: Primary | ICD-10-CM

## 2018-06-11 NOTE — MR AVS SNAPSHOT
[address] 
[phone] Patient: Mauricio Velázquez 
MRN: [id number] MAICOL:3/64/[id number] Visit Information Date & Time Provider Department Dept. Phone Encounter #  
 6/11/2018  2:45 PM Ketty Dawn, [facility] [phone] [id number] Follow-up Instructions Return in about 4 weeks (around 7/9/2018), or if symptoms worsen or fail to improve, for Follow up. Upcoming Health Maintenance Date Due  
 HPV Age 9Y-34Y (1 of 1 - Female 3 Dose Series) 6/17/2006 DTaP/Tdap/Td series (1 - Tdap) 6/17/2016 PAP AKA CERVICAL CYTOLOGY 6/17/2016 Influenza Age 5 to Adult 8/1/2018 Allergies as of 6/11/2018  Review Complete On: 6/11/2018 By: Ketty Dawn MD  
  
 Severity Noted Reaction Type Reactions Diflucan [Fluconazole]  05/21/2018    Swelling Throat Monistat 1 (Tioconazole) [Tioconazole]  05/21/2018    Other (comments) Vaginal burning Current Immunizations  Never Reviewed No immunizations on file. Not reviewed this visit You Were Diagnosed With   
  
 Codes Comments Anxiety and depression    -  Primary ICD-10-CM: F41.9, F32.9 ICD-9-CM: 300.00, 311 Vitals BP Pulse Temp Resp Height(growth percentile) Weight(growth percentile) 94/62 (BP 1 Location: Left arm, BP Patient Position: Sitting) 80 98.2 °F (36.8 °C) (Oral) 16 5' 5\" (1.651 m) 123 lb 6.4 oz (56 kg) LMP SpO2 BMI OB Status Smoking Status 10/05/2017 97% 20.53 kg/m2 IUD Never Smoker Vitals History BMI and BSA Data Body Mass Index Body Surface Area 20.53 kg/m 2 1.6 m 2 Preferred Pharmacy Pharm[facility] Name Phone [facility] [address] [phone] Your Updated Medication List  
  
   
This list is accurate as of 6/11/18  3:44 PM.  Always use your most recent med list.  
  
  
  
  
 LILETTA IU  
 by IntraUTERine route. M-VIT PO Take  by mouth. sertraline 25 mg tablet Commonly known as:  ZOLOFT Take 1 Tab by mouth daily. We Performed the Following REFERRAL TO PSYCHOLOGY [JTH45 Custom] Follow-up Instructions Return in about 4 weeks (around 7/9/2018), or if symptoms worsen or fail to improve, for Follow up. Referral Information Referral ID Referred By Referred To  
  
 [id number] Holley Balderas STUART Not Available Visits Status Start Date End Date 1 New Request 6/11/18 6/11/19 If your referral has a status of pending review or denied, additional information will be sent to support the outcome of this decision. Patient Instructions Joanne Townsend, [address] [facility] [address] Anabel Duke 33 
[phone] [address] [place] [address] Phone: [phone] FAX: [phone] 
928 [address] Charles [address] [facility] 23 [facility] Phone: [phone] FAX: [phone] 
201 [facility], [address] [facility] 7 [address] [facility] [phone] FAX: [phone] [address] [facility] 33 [facility] [phone] FAX: [phone] [facility], [address] [facility], [facility] [address] [employer] [facility] 102 [facility] 1111 [facility] [address] 102 [facility], 103 Replaced by [facility]  
[phone] [facility] [address] 1099 [facility], [address] 
[phone] [facility] Office [address] [place] [address], [address] [place] [address] 
[phone] [facility] ([place] near [facility]) [phone] [address] Location 1230 [facility] [address] 47 [facility] [address], [address] 
[phone] [facility] (for residents of [place]) Street Address [address], [facility] Phone Numbers 
[phone] TDD [phone] FAX [phone] Crisis Intervention 24 Hours/Day  
[phone] [facility] (for residents of [facility], [place], [place], [place], [place], [place], and [address] [place] [address]) Referral/Intake Services [phone] Emergency Services (24 hrs 365 days) [phone]  
(all clinics during business hours & 201 [facility] for after hours) Introducing [facility]! [facility] introduces [employer] patient portal. Now you can access parts of your medical record, email your doctor's office, and request medication refills online. 1. In your internet browser, go to [email or web address] 2. Click on the First Time User? Click Here link in the Sign In box. You will see the New Member Sign Up page. 3. Enter your [employer] Access Code exactly as it appears below. You will not need to use this code after youve completed the sign-up process. If you do not sign up before the expiration date, you must request a new code. · [employer] Access Code: [email or web address] Expires: 8/19/2018  8:40 AM 
 
4. Enter the last four digits of your Social Security Number (xxxx) and Date of Birth (mm/dd/yyyy) as indicated and click Submit. You will be taken to the next sign-up page. 5. Create a E-nterviewt ID. This will be your [employer] login ID and cannot be changed, so think of one that is secure and easy to remember. 6. Create a [employer] password. You can change your password at any time. 7. Enter your Password Reset Question and Answer. This can be used at a later time if you forget your password. 8. Enter your e-mail address. You will receive e-mail notification when new information is available in [address] 9. Click Sign Up. You can now view and download portions of your medical record. 10. Click the Download Summary menu link to download a portable copy of your medical information. If you have questions, please visit the Frequently Asked Questions section of the [employer] website. Remember, [employer] is NOT to be used for urgent needs. For medical emergencies, dial 911. Now available from your iPhone and Android! Please provide this summary of care documentation to your next provider. If you have any questions after today's visit, please call [phone].

## 2018-06-11 NOTE — PROGRESS NOTES
Identified Patient with two Patient identifiers (Name and ). Two Patient Identifiers confirmed. Reviewed record in preparation for visit and have obtained necessary documentation. Chief Complaint   Patient presents with    Anxiety    Depression       Visit Vitals    BP 94/62 (BP 1 Location: Left arm, BP Patient Position: Sitting)    Pulse 80    Temp 98.2 °F (36.8 °C) (Oral)    Resp 16    Ht 5' 5\" (1.651 m)    Wt 123 lb 6.4 oz (56 kg)    SpO2 97%    BMI 20.53 kg/m2       1. Have you been to the ER, urgent care clinic since your last visit? Hospitalized since your last visit? No    2. Have you seen or consulted any other health care providers outside of the [address] [place] [address] since your last visit? Include any pap smears or colon screening.  No

## 2018-06-11 NOTE — PROGRESS NOTES
Chief Complaint:  Chief Complaint   Patient presents with    Anxiety    Depression    Follow-up     HPI  Roseanna Ridley is a 25 y.o. female who presents for f/u of anxiety and depression. Started on zoloft 5/21 (she had taken it before). Was doing well and could feel the medication was beginning to work. Hit her head on a cabinet at work about a week ago and decided to stop zoloft at the time until she feels better. She was evaluated at [address] [place] [address] a couple days ago and told she has a concussion. She is planning to f/u with Neuro soon. She would like to wait to resume zoloft until her concussion is improved. SH:  Lives with boyfriend and another roommate. Works full time as a seamstress. No tobacco or drug use. Occasional alcohol use. ROS:   No fever  No chest pain  No SOB    Allergies: Allergies   Allergen Reactions    Diflucan [Fluconazole] Swelling     Throat      Monistat 1 (Tioconazole) [Tioconazole] Other (comments)     Vaginal burning       Meds:   Current Outpatient Prescriptions   Medication Sig Dispense Refill    prenatal vits/iron fum/folic (M-VIT PO) Take  by mouth.  levonorgestrel (LILETTA IU) by IntraUTERine route.  sertraline (ZOLOFT) 25 mg tablet Take 1 Tab by mouth daily. 30 Tab 1     PMH:  Past Medical History:   Diagnosis Date    Anxiety associated with depression     GERD (gastroesophageal reflux disease)     Kidney pain      Physical Exam:  Visit Vitals    BP 94/62 (BP 1 Location: Left arm, BP Patient Position: Sitting)    Pulse 80    Temp 98.2 °F (36.8 °C) (Oral)    Resp 16    Ht 5' 5\" (1.651 m)    Wt 123 lb 6.4 oz (56 kg)    LMP 10/05/2017    SpO2 97%    BMI 20.53 kg/m2     Gen: No apparent distress.  Pleasant and conversational.  HEENT: Normocephalic, pupils equally round and reactive, extraocular eye movements intact, hearing grossly normal bilaterally, nose normal and patent with no erythema, mucous membranes moist, pharynx normal without lesions  Neck: Supple, normal ROM  Lungs: Respirations unlabored, clear to auscultation bilaterally  Cardio: Regular rate and rhythm without murmurs, rubs, or gallops   Skin: Warm and dry  Neuro: Alert and responds to all questions appropriately. CN 2-12, sensation, strength, gait grossly intact. Psych: Makes good eye contact. Appearance, behavior, and conversation appropriate with normal speech rate, fluency, content. Assessment and Plan:     Encounter Diagnoses:    ICD-10-CM ICD-9-CM    1. Anxiety and depression F41.9 300.00 REFERRAL TO PSYCHOLOGY    F32.9 311      -Patient would like to wait on resuming zoloft for now. She can resume when she is ready as tolerated. I provided referral for psychology and provided her with local contact information.  -Supportive care for previously-diagnosed concussion reviewed. Normal Neuro exam today. Did let her know we can provide further evaluation and treatment for concussion in our office, but she prefers to schedule appointment with Neurology. Patient discussed with Dr. COLIN [facility], Attending Physician.     Aracelis Cruz MD  Family Medicine Resident, PGY-3

## 2018-12-08 NOTE — PATIENT INSTRUCTIONS
Lydia Gonzalez[address] 110 [facility], [facility]  [phone]    [address] Office  Phone: [phone]  FAX: [phone]  [address]  [address] Kv[address] Patel [facility], [facility] 23    [facility]  Phone: [phone]  FAX: [phone]  201 [facility], [address]  [facility] 7 130[facility] Office  [phone]  FAX: [phone]  [address]  [address]    [address]  [phone]: 800 [facility], 295 [facility], [facility] [address]    [employer] [facility] [address] [facility] [address]  [address], [address]   [phone]    The [address] [facility] Office  [address] [facility], [address]  [phone]    [facility] Office  [address], 15 [facility]  [phone]    [facility]  [place] near [facility])  [phone]    [facility] Location  [address], [facility] 33    3650 [facility]  [address] [place] [address], [address] [place] [address], [address]  [phone]    Jolly Samuels (for residents of [facility])  [address] Address  [facility] 18 [address]  Phone Numbers  [phone]   TDD [phone]   FAX [phone]  Crisis Intervention  24 Hours/Day   [phone] (for residents of [facility], [place], [place], [place], [place], [place], and [facility])  Referral/Intake Services [phone]   Emergency Services (24 hrs 365 days) [phone]   (all clinics during business hours & [place] location for after hours) Yes